# Patient Record
(demographics unavailable — no encounter records)

---

## 2018-09-26 NOTE — XMS
PreManage Notification: KODY WHITING MRN:[id number]
 
Security Information
 
Security Events
No recent Security Events currently on file
 
 
 
CRITERIA MET
------------
- [facility] - 2 Visits in 30 Days
 
 
CARE PROVIDERS
There are no care providers on record at this time.
 
Dina has no Care Guidelines for this patient.
 
YOVANNY VISIT COUNT (12 MO.)
-------------------------------------------------------------------------------------
4 [facility]
-------------------------------------------------------------------------------------
TOTAL 4
-------------------------------------------------------------------------------------
NOTE: Visits indicate total known visits.
 
ED/C VISIT TRACKING (12 MO.)
-------------------------------------------------------------------------------------
09/26/2018 13:33
[facility] [place]
 
TYPE: Emergency
 
COMPLAINT:
- L KNEE PAIN/INJURY
-------------------------------------------------------------------------------------
09/21/2018 19:36
[facility] [place]
 
TYPE: Emergency
 
COMPLAINT:
- L KNEE PAIN-INJURY
 
DIAGNOSES:
- Other and unspecified overexertion or strenuous movements or postures, initial
encounter
- Pain in left knee
- Personal history of nicotine dependence
- Sprain of unspecified site of left knee, initial encounter
-------------------------------------------------------------------------------------
06/14/2018 18:47
[facility] [place]
 
TYPE: Emergency
 
COMPLAINT:
- ABD PAIN
 
 
DIAGNOSES:
- Pelvic and perineal pain
- Lower abdominal pain, unspecified
- Personal history of nicotine dependence
-------------------------------------------------------------------------------------
05/22/2018 19:14
[facility] [place]
 
TYPE: Emergency
 
COMPLAINT:
- ABD PAIN
 
DIAGNOSES:
- Other acute postprocedural pain
- Unspecified abdominal pain
- Personal history of nicotine dependence
- Other long term (current) drug therapy
- OTHER SPECIFIED POSTPROCEDURAL STATES
- Pelvic and perineal pain
- Other specified postprocedural states
-------------------------------------------------------------------------------------
 
 
INPATIENT VISIT TRACKING (12 MO.)
No inpatient visits to display in this time frame
 
[email or web address]

## 2019-03-20 NOTE — XMS
PreManage Notification: KODY WHITING MRN:[id number]
 
Security Information
 
Security Events
No recent Security Events currently on file
 
 
 
CRITERIA MET
------------
- [facility] - Has Care Guidelines
- PDMP
 
 
CARE PROVIDERS
-------------------------------------------------------------------------------------
Pipo Chase     Internal Medicine: Pulmonary Disease     09/27/2018-Current
 
PHONE: Unknown
-------------------------------------------------------------------------------------
 
Dina has no Care Guidelines for this patient.
Care History
Medical/Surgical
09/27/2018    [facility]
 
      - Patient is currently established with [facility]. If patient is seen 
      in the ED during business hours. Please contact CHWs at [facility].
      - PATIENT HAS A FOLLOW UP APT WITH DR CHASE ON 09/27/18 FOR L KNEE PAIN/INJURY.
      Care Recommendation: This patient has had 5 or more Emergency Department
      visits in the last 12 months.\T\nbsp; Patient requires education on the scope
      and purpose of the ED as an acute care provider not a Primary Care Provider
      and should not be utilized for chronic conditions.\T\nbsp; These are
      guidelines and the provider should exercise clinical judgment when providing
      care.
E.D. VISIT COUNT (12 MO.)
-------------------------------------------------------------------------------------
5 [facility]
-------------------------------------------------------------------------------------
TOTAL 5
-------------------------------------------------------------------------------------
NOTE: Visits indicate total known visits.
 
ED/UCC VISIT TRACKING (12 MO.)
-------------------------------------------------------------------------------------
03/20/2019 16:35
[facility] [place]
 
TYPE: Emergency
 
COMPLAINT:
- POSS UTI
-------------------------------------------------------------------------------------
09/26/2018 13:33
[facility] [place]
 
TYPE: Emergency
 
 
COMPLAINT:
- L KNEE PAIN/INJURY
 
DIAGNOSES:
- Activity, yoga
- Exposure to other specified factors, initial encounter
- Sprain of unspecified site of left knee, initial encounter
- Pain in left knee
-------------------------------------------------------------------------------------
09/21/2018 19:36
[facility] [place]
 
TYPE: Emergency
 
COMPLAINT:
- L KNEE PAIN-INJURY
 
DIAGNOSES:
- Other and unspecified overexertion or strenuous movements or postures, initial
encounter
- Pain in left knee
- Personal history of nicotine dependence
- Sprain of unspecified site of left knee, initial encounter
-------------------------------------------------------------------------------------
06/14/2018 18:47
[facility] [place]
 
TYPE: Emergency
 
COMPLAINT:
- ABD PAIN
 
DIAGNOSES:
- Pelvic and perineal pain
- Lower abdominal pain, unspecified
- Personal history of nicotine dependence
-------------------------------------------------------------------------------------
05/22/2018 19:14
[facility] [place]
 
TYPE: Emergency
 
COMPLAINT:
- ABD PAIN
 
DIAGNOSES:
- Other acute postprocedural pain
- Unspecified abdominal pain
- Personal history of nicotine dependence
- Other long term (current) drug therapy
- OTHER SPECIFIED POSTPROCEDURAL STATES
- Pelvic and perineal pain
- Other specified postprocedural states
-------------------------------------------------------------------------------------
 
 
INPATIENT VISIT TRACKING (12 MO.)
No inpatient visits to display in this time frame
 
[email or web address]

## 2019-10-17 NOTE — XMS
PreManage Notification: KODY WHITING MRN:[id number]
 
Security Information
 
Security Events
No recent Security Events currently on file
 
 
 
CRITERIA MET
------------
- [facility] - Has Care Guidelines
 
 
CARE PROVIDERS
-------------------------------------------------------------------------------------
Pipo Chase     Internal Medicine: Pulmonary Disease     09/27/2018-Current
 
PHONE: Unknown
-------------------------------------------------------------------------------------
 
Dina has no Care Guidelines for this patient.
Care History
Medical/Surgical
09/27/2018    [facility]
 
      - Patient is currently established with [facility]. If patient is seen 
      in the ED during business hours. Please contact CHWs at [facility].
      - PATIENT HAS A FOLLOW UP APT WITH DR CHASE ON 09/27/18 FOR L KNEE PAIN/INJURY.
      Care Recommendation: This patient has had 5 or more Emergency Department
      visits in the last 12 months.\T\nbsp; Patient requires education on the scope
      and purpose of the ED as an acute care provider not a Primary Care Provider
      and should not be utilized for chronic conditions.\T\nbsp; These are
      guidelines and the provider should exercise clinical judgment when providing
      care.
E.D. VISIT COUNT (12 MO.)
-------------------------------------------------------------------------------------
2 [facility]
-------------------------------------------------------------------------------------
TOTAL 2
-------------------------------------------------------------------------------------
NOTE: Visits indicate total known visits.
 
ED/UCC VISIT TRACKING (12 MO.)
-------------------------------------------------------------------------------------
10/17/2019 19:40
[facility] [place]
 
TYPE: Emergency
 
COMPLAINT:
- VAG. BLEEDING/PREGNANT
-------------------------------------------------------------------------------------
03/20/2019 16:35
[facility] [place]
 
TYPE: Emergency
 
COMPLAINT:
 
- POSS UTI
 
DIAGNOSES:
- Acute cystitis without hematuria
- Dysuria
-------------------------------------------------------------------------------------
 
 
INPATIENT VISIT TRACKING (12 MO.)
No inpatient visits to display in this time frame
 
[email or web address]

## 2019-10-22 NOTE — NUR
10/22/19 1248 Anne-Marie George
1231 PT ARRIVED TO PACU ON 6L VIA MASK, PT NONAROUSABLE AND RESP EVEN
AND UNLBAORED.
 
1238 PT WOKE TO TACTILE STIMULI AND REACHING FOR FACE, ORAL AIRWAY
REMOVED. O2 MASK REMOVED.
 
1240 PT TALKING TO RN AND VSS.
 
1247 MD AT BEDSIDE TALKING TO PT, HOB INCREASED. VSS. PT READY FOR
TRANSFER.

## 2019-10-22 NOTE — NUR
ARMINDALLO GIVEN. PATIENT EATING AND DRINKING AND TOLERATING THAT WELL. PATIENT IS
UP TO THE BATHROOM WITH RN STANDBY. PATIENT AMBULATES WELL AND VOIDS 100 ML
BLOOD TINGED URINE. PATIENT IS BACK IN BED AND ASKS FOR PRN FOR PAIN.
DISCHARGE INSTRUCTIONS ARE GIVEN AND PATIENT VERBALIZES UNDERSTANDING.

## 2019-10-23 NOTE — PATH
[facility]
                                    [address], [place]  [address]
_________________________________________________________________________________________
                                                                 Signed   
 
 
 
SPECIMEN(S): A PRODUCTS OF CONCEPTION
 
SPECIMEN SOURCE:
A. PRODUCTS OF CONCEPTION
 
CLINICAL HISTORY:
Pre: Missed AB,  A2, LMP 19. Post: Suction DC.
 
FINAL PATHOLOGIC DIAGNOSIS:
Uterine contents:
-  Products of conception.
LJA:cml:C2NR
 
MICROSCOPIC EXAMINATION:
Histologic sections of all submitted blocks are examined by light microscopy.  
These findings, together with the gross examination, support the pathologic 
diagnosis. 
 
GROSS DESCRIPTION:
The specimen, labeled "MC, products of conception," is received in formalin and 
consists of a 5.8 x 5.3 x 1.4 cm aggregate of red-brown hemorrhagic, spongy and 
friable fragments.  Representative 
sections are submitted in cassette (A1).
AM (under the direct supervision of a pathologist)
The Gross Description was prepared using a voice recognition system.  The 
report was reviewed for accuracy; however, sound-alike word errors, addition 
and/or deletions may occur.  If there is any 
question about this report, please contact Client Services.
 
PERFORMING LABORATORY:
The technical component was performed by [facility], [address] [place] [id number] (Medical Director: Isela Casey MD; CLIA# [id number]). 
[facility] [address] [address] (Medical Director:  Carlos Almonte MD; CLIA# 
[id number]). 
 
Diagnostician:  Carlos Almonte MD
Pathologist
Electronically Signed 10/23/2019
 
 
                                                                                    
_________________________________________________________________________________________
PATIENT NAME:     KODY WHITING YAS                   
MEDICAL RECORD #: [id number]            PATHOLOGY                     
          ACCT #: [id number]       ACCESSION #: [id number]     
DATE OF BIRTH:   90            REPORT #: [id number]       
PHYSICIAN:        [facility]              
PCP:              YNES COON MD    
REPORT IS CONFIDENTIAL AND NOT TO BE RELEASED WITHOUT AUTHORIZATION
 
 
                                  [address]  [address]
_________________________________________________________________________________________
                                                                 Signed   
 
 
Copies:                                
~
 
 
 
 
 
 
 
 
 
 
 
 
 
 
 
 
 
 
 
 
 
 
 
 
 
 
 
 
 
 
 
 
 
 
 
 
 
 
 
 
 
                                                                                    
_________________________________________________________________________________________
PATIENT NAME:     KODY WHITING YAS                   
MEDICAL RECORD #: [id number]            PATHOLOGY                     
          ACCT #: [id number]       ACCESSION #: [id number]     
DATE OF BIRTH:   90            REPORT #: [id number]       
PHYSICIAN:        [facility]              
PCP:              YNES COON MD    
REPORT IS CONFIDENTIAL AND NOT TO BE RELEASED WITHOUT AUTHORIZATION

## 2019-10-23 NOTE — OR
[facility]
                                    [address] [place]  [address]
_________________________________________________________________________________________
                                                                 Signed   
 
 
DATE OF OPERATION:
10/22/2019
 
SURGEON:
Jovan Helton MD
 
PREOPERATIVE DIAGNOSIS:
Missed .
 
POSTOPERATIVE DIAGNOSIS:
Missed .
 
PROCEDURE:
Suction D and C.
 
ANESTHESIA:
MAC.
 
ESTIMATED BLOOD LOSS:
100 mL.
 
SPECIMEN:
Uterine contents.
 
PACKING:
None.
 
FINDINGS:
Vagina, no blood.  Cervix, thick and closed.  Uterus, 6 week size, mid position and
symmetric, mobile and no adnexal masses palpable. 
 
COMPLICATIONS:
None.
 
DESCRIPTION OF PROCEDURE:
The patient was brought to the operating room, placed in supine position.  After
adequate general anesthesia was obtained, she was prepped and draped in usual sterile
fashion.  Bimanual exam was done and a weighted speculum placed in the vagina.  The
patient's bladder was drained with straight cath during preop prep.  An Allis clamp was
attached to the anterior lip of the cervix and the cervix sounded to 10 cm and the
cervix was dilated up to a #9-Honduran dilator and then a #9 curved suction tip curette
was attached to the suction machine.  The suction machine tested, set on appropriate
 
    Electronically Signed By: JOVAN HELTON MD  10/23/19 0223
_________________________________________________________________________________________
PATIENT NAME:     KODY WHITING                    
MEDICAL RECORD #: [id number]            OPERATIVE REPORT              
          ACCT #: [id number]  
DATE OF BIRTH:   90            REPORT #: [id number]      
PHYSICIAN:        JOVAN HELTON MD      
PCP:              YNES COON MD    
REPORT IS CONFIDENTIAL AND NOT TO BE RELEASED WITHOUT AUTHORIZATION
 
 
                                  [facility]
                                    [address] [facility]
                                  [place], [place]  [address]
_________________________________________________________________________________________
                                                                 Signed   
 
 
suction and then the curette carefully introduced into the uterine cavity up to the
fundus.  Suction was applied and the curette spun in 360 degree fashion.  A small amount
of tissue was removed.  There was slightly more normal blood during the curetting, so 20
units of Pitocin was added to her IV bag and the procedure continued.  The uterine
cavity was scraped in a 360 degree fashion, carefully inserting the curette and then
without suction, applying suction and then pulling back curette the top, bottom, both
sides and the fundus.  Care was taken to not press tightly in the fundus to avoid any
perforation.  No additional tissue was removed and the uterus was palpated, noted to be
a normal-sized and much more firm and no more bleeding was noted through the cervix.
All instruments were then removed from the vagina.  The patient tolerated the procedure
well, went to recovery room in good condition.  The sponge and instrument count were
correct at the end of the procedure.  The uterine contents were sent to Pathology for
identification. 
 
 
 
            ________________________________________
            MD NICOLETTE Doyle/AUBREY
Job #:  [id number]
DD:  10/22/2019 12:42:08
DT:  10/22/2019 17:12:15
 
cc:            Ely Ferreira MD
 
 
Copies:                                
~
 
 
 
 
 
 
 
 
 
 
 
 
 
    Electronically Signed By: JOVAN HELTON MD  10/23/19 0223
_________________________________________________________________________________________
PATIENT NAME:     KODY WHITING                    
MEDICAL RECORD #: [id number]            OPERATIVE REPORT              
          ACCT #: [id number]  
DATE OF BIRTH:   90            REPORT #: [id number]      
PHYSICIAN:        JOVAN HELTON MD      
PCP:              YNES COON MD    
REPORT IS CONFIDENTIAL AND NOT TO BE RELEASED WITHOUT AUTHORIZATION